# Patient Record
Sex: FEMALE | Race: BLACK OR AFRICAN AMERICAN
[De-identification: names, ages, dates, MRNs, and addresses within clinical notes are randomized per-mention and may not be internally consistent; named-entity substitution may affect disease eponyms.]

---

## 2019-06-04 ENCOUNTER — HOSPITAL ENCOUNTER (EMERGENCY)
Dept: HOSPITAL 96 - M.ERS | Age: 54
Discharge: HOME | End: 2019-06-04
Payer: COMMERCIAL

## 2019-06-04 VITALS — WEIGHT: 197.01 LBS | HEIGHT: 64 IN | BODY MASS INDEX: 33.63 KG/M2

## 2019-06-04 VITALS — SYSTOLIC BLOOD PRESSURE: 135 MMHG | DIASTOLIC BLOOD PRESSURE: 81 MMHG

## 2019-06-04 DIAGNOSIS — Z88.5: ICD-10-CM

## 2019-06-04 DIAGNOSIS — T31.0: ICD-10-CM
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
## 2019-06-04 DIAGNOSIS — Z88.6: ICD-10-CM

## 2019-06-04 DIAGNOSIS — T21.21XA: Primary | ICD-10-CM
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
## 2019-06-04 DIAGNOSIS — E11.9: ICD-10-CM

## 2019-09-03 ENCOUNTER — HOSPITAL ENCOUNTER (OUTPATIENT)
Dept: HOSPITAL 96 - M.MRI | Age: 54
End: 2019-09-03
Attending: ORTHOPAEDIC SURGERY
Payer: COMMERCIAL

## 2019-09-03 DIAGNOSIS — R60.0: ICD-10-CM

## 2019-09-03 DIAGNOSIS — S83.207A: Primary | ICD-10-CM
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
## 2019-10-09 ENCOUNTER — HOSPITAL ENCOUNTER (OUTPATIENT)
Dept: HOSPITAL 96 - M.SUR | Age: 54
Setting detail: OBSERVATION
LOS: 1 days | Discharge: HOME | End: 2019-10-10
Attending: INTERNAL MEDICINE | Admitting: INTERNAL MEDICINE
Payer: COMMERCIAL

## 2019-10-09 VITALS — DIASTOLIC BLOOD PRESSURE: 71 MMHG | SYSTOLIC BLOOD PRESSURE: 114 MMHG

## 2019-10-09 VITALS — BODY MASS INDEX: 42.33 KG/M2 | WEIGHT: 215.6 LBS | HEIGHT: 60 IN

## 2019-10-09 VITALS — DIASTOLIC BLOOD PRESSURE: 92 MMHG | SYSTOLIC BLOOD PRESSURE: 129 MMHG
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
## 2019-10-09 DIAGNOSIS — E11.9: ICD-10-CM

## 2019-10-09 DIAGNOSIS — Z79.899: ICD-10-CM
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
## 2019-10-09 DIAGNOSIS — S83.282A: Primary | ICD-10-CM

## 2019-10-09 LAB
ALBUMIN SERPL-MCNC: 4 G/DL (ref 3.4–5)
ALP SERPL-CCNC: 113 U/L (ref 46–116)
ALT SERPL-CCNC: 44 U/L (ref 30–65)
ANION GAP SERPL CALC-SCNC: 10 MMOL/L (ref 7–16)
AST SERPL-CCNC: 24 U/L (ref 15–37)
BILIRUB SERPL-MCNC: 0.8 MG/DL
BUN SERPL-MCNC: 4 MG/DL (ref 7–18)
CALCIUM SERPL-MCNC: 9.4 MG/DL (ref 8.5–10.1)
CHLORIDE SERPL-SCNC: 105 MMOL/L (ref 98–107)
CO2 SERPL-SCNC: 27 MMOL/L (ref 21–32)
CREAT SERPL-MCNC: 0.7 MG/DL (ref 0.6–1.3)
GLUCOSE SERPL-MCNC: 143 MG/DL (ref 70–99)
HCT VFR BLD CALC: 40.1 % (ref 37–47)
HGB BLD-MCNC: 13.9 GM/DL (ref 12–15)
MCH RBC QN AUTO: 29.3 PG (ref 26–34)
MCHC RBC AUTO-ENTMCNC: 34.6 G/DL (ref 28–37)
MCV RBC: 84.5 FL (ref 80–100)
MPV: 8.8 FL. (ref 7.2–11.1)
PLATELET COUNT*: 282 THOU/UL (ref 150–400)
POTASSIUM SERPL-SCNC: 3.7 MMOL/L (ref 3.5–5.1)
PROT SERPL-MCNC: 7.4 G/DL (ref 6.4–8.2)
RBC # BLD AUTO: 4.75 MIL/UL (ref 4.2–5)
RDW-CV: 13.6 % (ref 10.5–14.5)
SODIUM SERPL-SCNC: 142 MMOL/L (ref 136–145)
WBC # BLD AUTO: 7.1 THOU/UL (ref 4–11)

## 2019-10-09 NOTE — NUR
PATIENT ARRIVED TO FLOOR AT 1350 WITH FAMILY AT BEDSIDE. ALL SAFETY MEASURES
MAINTAINED. PATIENT DENIES PAIN AT THIS TIME. DR. SCHMIDT NOTIFIED OF
PATIENT'S ARRIVAL. SURGICAL DRESSING C/D/I.

## 2019-10-09 NOTE — EKG
[place] [address]
Phone:  [phone]                     ELECTROCARDIOGRAM REPORT      
_______________________________________________________________________________
 
Name:       NIKOS DREW               Room:           [address]R.#:  [id number]      Account #:      [id number]  
Admission:  10/09/19     Attend Phys:    NEVIN Driver
Discharge:               Date of Birth:  [birth year]  
         Report #: [id number]
    [id number]
_______________________________________________________________________________
THIS REPORT FOR:  //name//                      
 
                          [facility]
                                       
Test Date:    2019-10-09               Test Time:    10:00:35
Pat Name:     NIKOS DREW           Department:   
Patient ID:   [id number]            Room:         [facility]
Gender:       F                        Technician:   Norah MIRANDA
:          1965               Requested By: Manjeet Light
Order Number: [id number]    Anel MD:   Darrel Cazares
                                 Measurements
Intervals                              Axis          
Rate:         82                       P:            48
IA:           120                      QRS:          -15
QRSD:         93                       T:            -5
QT:           345                                    
QTc:          403                                    
                           Interpretive Statements
Sinus rhythm
Inferior infarct, old
Consider anterior infarct
Compared to ECG 2009 17:40:50
Myocardial infarct finding now present
T-wave abnormality no longer present
 
Electronically Signed On 10-9-2019 16:21:55 CDT by Darrel Cazares
[email or web address]
 
 
 
 
 
 
 
 
 
 
 
 
 
 
 
 
  <ELECTRONICALLY SIGNED>
                                           By: Darrel Cazares MD, FACC     
  10/09/19     1621
D: 10/09/19 1000   _____________________________________
T: 10/09/19 1000   Darrel Cazares MD, [facility]       /EPI

## 2019-10-10 VITALS — DIASTOLIC BLOOD PRESSURE: 77 MMHG | SYSTOLIC BLOOD PRESSURE: 126 MMHG

## 2019-10-10 VITALS — SYSTOLIC BLOOD PRESSURE: 118 MMHG | DIASTOLIC BLOOD PRESSURE: 70 MMHG

## 2019-10-10 VITALS — DIASTOLIC BLOOD PRESSURE: 70 MMHG | SYSTOLIC BLOOD PRESSURE: 118 MMHG

## 2019-10-10 VITALS — SYSTOLIC BLOOD PRESSURE: 101 MMHG | DIASTOLIC BLOOD PRESSURE: 65 MMHG

## 2019-10-10 NOTE — NUR
PATIENT A&OX4. NO SIGNS OF DISTRESS OBSERVED. ALL SAFETY MEASURES MAINAINED.
PATIENT REQUESTED WALKER. PT AND CASE MANAGEMENT NOTIFIED. DR. WAITE'S OFFICE
NOTIFIED AND IN AGREEMENT WITH PATIENT'S DISCHARGE. DISCHARGE PAPERWORK AND
PRESCRIPTION SENT WITH PATIENT. PATIENT DENIED FURTHER NEEDS.

## 2019-10-10 NOTE — NUR
PT ALERT AND ORIENTED. VITALS STABLE. MEDS GIVEN AS ORDERED. OXY IR X1 AND
NORCO X1 GIVEN THIS SHIFT PER PT REQUEST. DRESSING TO LT KNEE CLEAN AND
INTACT. PT UP TO THE BATHROOM WITH STANDBY ASSIST WITH WALKER. HOURLY ROUNDING
COMPLETED. WILL CONTINUE TO MONITOR.

## 2019-10-10 NOTE — NUR
KEYON was informed pt needed RW for home and pt to dc home today.  KEYON spoke with
pt and then called Kayy with [employer] who approved for RW to be issued
to pt.  KEYON faxed order and then placed original order in pt chart.

## 2019-10-11 NOTE — OP
[facility] 
[address] [place]  [address]                    OPERATIVE REPORT              
_______________________________________________________________________________
 
Name:       NIKOS DREW               Room:           [address] Jessa DUTTON#:  [id number]      Account #:      [id number]  
Admission:  10/09/19     Attend Phys:    NEVIN Driver
Discharge:  10/10/19     Date of Birth:  [birth year]  
         Report #: [id number]
                                                                     [id number]  
_______________________________________________________________________________
THIS REPORT FOR:  //name//                      
 
CC: MIKAELA Linder
 
DATE OF SERVICE:  10/09/2019
 
 
PREOPERATIVE DIAGNOSIS:  Left knee lateral meniscus tear.
 
POSTOPERATIVE DIAGNOSES:
1.  Left knee lateral meniscus tear.
2.  Lateral tracking patella.
 
PROCEDURE PERFORMED:
1.  Left knee arthroscopic surgery with lateral meniscus repair.
2.  Lateral release.
 
SURGEON:  Manjeet Light II, DO
 
ASSISTANT:  JEFF Manuel
 
ANESTHESIA:  Per operative record.
 
ESTIMATED BLOOD LOSS:  Minimal.
 
ANTIBIOTICS:  Per operative record.
 
DRAINS:  None.
 
COMPLICATIONS:  None.
 
CONDITION OF PATIENT:  Stable to recovery room.
 
DESCRIPTION OF PROCEDURE:  The patient was taken to the operative suite and
placed supine on the operative table, given appropriate anesthesia.  The
patient's affected lower extremity was sterilely prepped and draped with a
well-padded knee arthroscopic centeno.  Surgery began by medial and lateral
portal incisions.  The arthroscope was advanced in the joint.  There was shown
to be no significant chondromalacia on the medial and lateral femoral condyle. 
A medial meniscus was probed and shown to be intact.  Lateral meniscus was
probed and shown to have a tear in the most posterior medial aspect of the
lateral meniscus near the posterior horn.  This was pulling away from its most
posterior attachment.  The free edge was intact with only minor fraying. 
Establishing a lateral portal and putting the camera through a medial portal,
 
 
 
[address]  [address]                    OPERATIVE REPORT              
_______________________________________________________________________________
 
Name:       NIKOS DREW               Room:           [address] Jessa DUTTON#:  [id number]      Account #:      [id number]  
Admission:  10/09/19     Attend Phys:    NEVIN Driver
Discharge:  10/10/19     Date of Birth:  [birth year]  
         Report #: [id number]
                                                                     [id number]  
_______________________________________________________________________________
the meniscus tear was roughened along its undersurface as well as posterior
aspect down to fresh tissue.  It was then secured utilizing a stitch through the
posterior aspect and brought through the anterior aspect with a circumferential
stitch placed.  This was then tied in a knot and sutured into appropriate
tension.  This was probed and shown to be intact with lateral meniscus repair
being performed.  ACL and PCL appeared intact throughout the procedure.  There
was a lateral tilt to patella with lateral patellar tracking and fixed lateral
tilt and translation.  Utilizing a cautery wand, a lateral release was performed
over the lateral patellar retinaculum to allow for decreased patellar tracking
and tilt and improved alignment.  The knee was drained of arthroscopic fluid. 
Final irrigation was performed.  It was then closed with 4-0 nylon in simple
fashion.  Dermabond and sterile dressing applied.  The patient transported to
Recovery in stable condition.  Counts were correct throughout the procedure.
 
 
 
 
 
 
 
 
 
 
 
 
 
 
 
 
 
 
 
 
 
 
 
 
 
 
 
 
 
 
 
<ELECTRONICALLY SIGNED>
                                        By:  Manjeet Light II, DO     
10/11/19     0840
D: 10/10/19 0830_______________________________________
T: 10/10/19 0848Robwilma Light II, DO        /nt

## 2019-11-14 ENCOUNTER — HOSPITAL ENCOUNTER (OUTPATIENT)
Dept: HOSPITAL 96 - M.MRI | Age: 54
End: 2019-11-14
Attending: ORTHOPAEDIC SURGERY
Payer: COMMERCIAL
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
## 2019-11-14 DIAGNOSIS — S73.192A: Primary | ICD-10-CM
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
## 2019-12-02 ENCOUNTER — HOSPITAL ENCOUNTER (OUTPATIENT)
Dept: HOSPITAL 96 - M.MRI | Age: 54
End: 2019-12-02
Attending: ORTHOPAEDIC SURGERY
Payer: COMMERCIAL

## 2019-12-02 DIAGNOSIS — M47.26: Primary | ICD-10-CM

## 2020-01-27 ENCOUNTER — HOSPITAL ENCOUNTER (EMERGENCY)
Dept: HOSPITAL 96 - M.ERS | Age: 55
Discharge: HOME | End: 2020-01-27
Payer: COMMERCIAL

## 2020-01-27 VITALS — HEIGHT: 61 IN | WEIGHT: 197.01 LBS | BODY MASS INDEX: 37.19 KG/M2

## 2020-01-27 VITALS — SYSTOLIC BLOOD PRESSURE: 145 MMHG | DIASTOLIC BLOOD PRESSURE: 90 MMHG

## 2020-01-27 DIAGNOSIS — S05.02XA: Primary | ICD-10-CM
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
## 2020-01-27 DIAGNOSIS — H00.025: ICD-10-CM

## 2020-01-27 DIAGNOSIS — E11.9: ICD-10-CM
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
## 2020-01-27 DIAGNOSIS — Z91.040: ICD-10-CM

## 2020-01-27 DIAGNOSIS — Z79.4: ICD-10-CM
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
## 2020-01-27 DIAGNOSIS — Z86.73: ICD-10-CM

## 2020-02-03 ENCOUNTER — HOSPITAL ENCOUNTER (EMERGENCY)
Dept: HOSPITAL 96 - M.ERS | Age: 55
Discharge: HOME | End: 2020-02-03
Payer: COMMERCIAL

## 2020-02-03 VITALS — BODY MASS INDEX: 37.57 KG/M2 | HEIGHT: 61 IN | WEIGHT: 199.01 LBS

## 2020-02-03 VITALS — DIASTOLIC BLOOD PRESSURE: 79 MMHG | SYSTOLIC BLOOD PRESSURE: 125 MMHG

## 2020-02-03 DIAGNOSIS — Z86.73: ICD-10-CM

## 2020-02-03 DIAGNOSIS — M25.562: Primary | ICD-10-CM

## 2020-02-03 DIAGNOSIS — E11.9: ICD-10-CM

## 2020-02-03 DIAGNOSIS — Z91.040: ICD-10-CM

## 2020-02-03 DIAGNOSIS — Z90.710: ICD-10-CM

## 2020-02-03 DIAGNOSIS — Z79.4: ICD-10-CM

## 2020-03-09 ENCOUNTER — HOSPITAL ENCOUNTER (EMERGENCY)
Dept: HOSPITAL 96 - M.ERS | Age: 55
Discharge: HOME | End: 2020-03-09
Payer: COMMERCIAL

## 2020-03-09 VITALS — SYSTOLIC BLOOD PRESSURE: 133 MMHG | DIASTOLIC BLOOD PRESSURE: 62 MMHG

## 2020-03-09 VITALS — BODY MASS INDEX: 37.38 KG/M2 | HEIGHT: 61 IN | WEIGHT: 198 LBS

## 2020-03-09 DIAGNOSIS — E11.9: ICD-10-CM

## 2020-03-09 DIAGNOSIS — Z90.710: ICD-10-CM

## 2020-03-09 DIAGNOSIS — J32.8: Primary | ICD-10-CM

## 2020-03-09 DIAGNOSIS — Z91.040: ICD-10-CM

## 2020-03-09 DIAGNOSIS — Z86.73: ICD-10-CM

## 2020-03-09 DIAGNOSIS — Z79.4: ICD-10-CM

## 2020-03-09 LAB
ABSOLUTE BASOPHILS: 0 THOU/UL (ref 0–0.2)
ABSOLUTE EOSINOPHILS: 0 THOU/UL (ref 0–0.7)
ABSOLUTE MONOCYTES: 0.4 THOU/UL (ref 0–1.2)
ALBUMIN SERPL-MCNC: 3.7 G/DL (ref 3.4–5)
ALP SERPL-CCNC: 121 U/L (ref 46–116)
ALT SERPL-CCNC: 38 U/L (ref 30–65)
ANION GAP SERPL CALC-SCNC: 10 MMOL/L (ref 7–16)
AST SERPL-CCNC: 19 U/L (ref 15–37)
BASOPHILS NFR BLD AUTO: 0.4 %
BILIRUB SERPL-MCNC: 0.4 MG/DL
BUN SERPL-MCNC: 5 MG/DL (ref 7–18)
CALCIUM SERPL-MCNC: 9 MG/DL (ref 8.5–10.1)
CHLORIDE SERPL-SCNC: 104 MMOL/L (ref 98–107)
CO2 SERPL-SCNC: 29 MMOL/L (ref 21–32)
CREAT SERPL-MCNC: 0.7 MG/DL (ref 0.6–1.3)
EOSINOPHIL NFR BLD: 0.5 %
GLUCOSE SERPL-MCNC: 163 MG/DL (ref 70–99)
GRANULOCYTES NFR BLD MANUAL: 71.1 %
HCT VFR BLD CALC: 39.5 % (ref 37–47)
HGB BLD-MCNC: 13.4 GM/DL (ref 12–15)
LYMPHOCYTES # BLD: 1.9 THOU/UL (ref 0.8–5.3)
LYMPHOCYTES NFR BLD AUTO: 22.9 %
MCH RBC QN AUTO: 29.2 PG (ref 26–34)
MCHC RBC AUTO-ENTMCNC: 33.9 G/DL (ref 28–37)
MCV RBC: 86.2 FL (ref 80–100)
MONOCYTES NFR BLD: 5.1 %
MPV: 8.8 FL. (ref 7.2–11.1)
NEUTROPHILS # BLD: 6 THOU/UL (ref 1.6–8.1)
NUCLEATED RBCS: 0 /100WBC
PLATELET COUNT*: 253 THOU/UL (ref 150–400)
POTASSIUM SERPL-SCNC: 3.3 MMOL/L (ref 3.5–5.1)
PROT SERPL-MCNC: 6.9 G/DL (ref 6.4–8.2)
RBC # BLD AUTO: 4.59 MIL/UL (ref 4.2–5)
RDW-CV: 13.4 % (ref 10.5–14.5)
SODIUM SERPL-SCNC: 143 MMOL/L (ref 136–145)
WBC # BLD AUTO: 8.4 THOU/UL (ref 4–11)

## 2020-03-24 ENCOUNTER — HOSPITAL ENCOUNTER (EMERGENCY)
Dept: HOSPITAL 96 - M.ERS | Age: 55
Discharge: HOME | End: 2020-03-24
Payer: COMMERCIAL

## 2020-03-24 VITALS — HEIGHT: 61 IN | WEIGHT: 192.99 LBS | BODY MASS INDEX: 36.44 KG/M2

## 2020-03-24 VITALS — SYSTOLIC BLOOD PRESSURE: 125 MMHG | DIASTOLIC BLOOD PRESSURE: 76 MMHG

## 2020-03-24 DIAGNOSIS — Z79.4: ICD-10-CM

## 2020-03-24 DIAGNOSIS — K14.1: Primary | ICD-10-CM

## 2020-03-24 DIAGNOSIS — Z86.73: ICD-10-CM

## 2020-03-24 DIAGNOSIS — Z91.040: ICD-10-CM

## 2020-03-24 DIAGNOSIS — E11.9: ICD-10-CM

## 2020-03-24 DIAGNOSIS — Z90.710: ICD-10-CM
